# Patient Record
Sex: MALE | Race: WHITE | NOT HISPANIC OR LATINO | Employment: FULL TIME | URBAN - METROPOLITAN AREA
[De-identification: names, ages, dates, MRNs, and addresses within clinical notes are randomized per-mention and may not be internally consistent; named-entity substitution may affect disease eponyms.]

---

## 2018-12-20 ENCOUNTER — VIRTUAL VISIT (OUTPATIENT)
Dept: FAMILY MEDICINE | Facility: OTHER | Age: 38
End: 2018-12-20

## 2018-12-20 NOTE — PROGRESS NOTES
"Date:   Clinician: Lesley See  Clinician NPI: [id number]  Patient: Facundo Wakefield  Patient : 1980  Patient Address: [address] [place] [address]  Patient Phone: [phone]  Visit Protocol: Eczema  Patient Summary:  Facundo is a 38 year old ( : 1980 ) male who initiated a Visit for evaluation of atopic dermatitis (eczema). When asked the question \"Please sign me up to receive news, health information and promotions. \", Facundo responded \"No\".    Images of his skin condition were uploaded.  His symptoms started more than a month ago. The rash is located on his legs, elbows, hairline, face, chest, arms, abdomen, buttocks, scalp, and back. The rash is red in color.   The affected area has dry skin, flaky skin, scaly skin, and crusts. It feels itchy. The symptoms do not interfere with his sleep.   Symptom details   Redness: The redness has not rapidly increased in size.   Denied symptoms include blisters, sores, drainage, scabs, warm to touch, tender to touch, burning, pain, and numbness. Facundo does not feel feverish.   Treatments or home remedies used to relieve the symptoms as reported by the patient (free text): I have psoriasis and have used both Taclonex and Fluocinonide topical solutions   Precipitating events  Facundo did not come in contact with any irritants prior to the onset of his symptoms and has not been in close contact with anyone that has similar symptoms. He also did not spend time in a wooded area, swim, travel, or spend time in the sun just before his symptoms started.   Pertinent medical history  Facundo has experienced this skin condition before. His current skin condition does not come and go. The last time he experienced this skin condition was more than a year ago.   Facundo has a history of eczema.   Ongoing medical conditions were denied.   Facundo does not smoke or use smokeless tobacco.   Additional information as reported by the patient " (free text): I am looking to get a re-fill of the current medications as they are  and also look for another possible topical cream that will cover a larger area as these are for small areas on my face/hairline   MEDICATIONS: Taclonex topical, Fluocinonide-E topical, ALLERGIES: amoxicillin  Clinician Response:  Dear Facundo,  Your health is our priority. To determine the most appropriate care for you, I would like you to be seen in person to further discuss your health history and symptoms.  You will not be charged for this Visit. Thank you for trusting us with your care.   Diagnosis: Refer for additional evaluation  Diagnosis ICD: R69  Diagnosis ICD: 462.0

## 2020-03-01 ENCOUNTER — HEALTH MAINTENANCE LETTER (OUTPATIENT)
Age: 40
End: 2020-03-01

## 2020-11-21 ENCOUNTER — HOSPITAL ENCOUNTER (EMERGENCY)
Facility: CLINIC | Age: 40
Discharge: HOME | End: 2020-11-21
Attending: EMERGENCY MEDICINE | Admitting: EMERGENCY MEDICINE
Payer: COMMERCIAL

## 2020-11-21 VITALS
TEMPERATURE: 97.6 F | HEART RATE: 104 BPM | DIASTOLIC BLOOD PRESSURE: 114 MMHG | OXYGEN SATURATION: 95 % | RESPIRATION RATE: 24 BRPM | SYSTOLIC BLOOD PRESSURE: 168 MMHG

## 2020-11-21 DIAGNOSIS — R20.0 NUMBNESS: ICD-10-CM

## 2020-11-21 LAB
ANION GAP SERPL CALCULATED.3IONS-SCNC: 7 MMOL/L (ref 3–14)
BASOPHILS # BLD AUTO: 0.1 10E9/L (ref 0–0.2)
BASOPHILS NFR BLD AUTO: 0.7 %
BUN SERPL-MCNC: 5 MG/DL (ref 7–30)
CALCIUM SERPL-MCNC: 9 MG/DL (ref 8.5–10.1)
CHLORIDE SERPL-SCNC: 106 MMOL/L (ref 94–109)
CO2 SERPL-SCNC: 28 MMOL/L (ref 20–32)
CREAT SERPL-MCNC: 0.92 MG/DL (ref 0.66–1.25)
DIFFERENTIAL METHOD BLD: ABNORMAL
EOSINOPHIL # BLD AUTO: 0.1 10E9/L (ref 0–0.7)
EOSINOPHIL NFR BLD AUTO: 0.6 %
ERYTHROCYTE [DISTWIDTH] IN BLOOD BY AUTOMATED COUNT: 12.3 % (ref 10–15)
GFR SERPL CREATININE-BSD FRML MDRD: >90 ML/MIN/{1.73_M2}
GLUCOSE SERPL-MCNC: 116 MG/DL (ref 70–99)
HCT VFR BLD AUTO: 50.9 % (ref 40–53)
HGB BLD-MCNC: 17.4 G/DL (ref 13.3–17.7)
IMM GRANULOCYTES # BLD: 0.1 10E9/L (ref 0–0.4)
IMM GRANULOCYTES NFR BLD: 0.6 %
LYMPHOCYTES # BLD AUTO: 1 10E9/L (ref 0.8–5.3)
LYMPHOCYTES NFR BLD AUTO: 9.2 %
MCH RBC QN AUTO: 33.7 PG (ref 26.5–33)
MCHC RBC AUTO-ENTMCNC: 34.2 G/DL (ref 31.5–36.5)
MCV RBC AUTO: 99 FL (ref 78–100)
MONOCYTES # BLD AUTO: 0.7 10E9/L (ref 0–1.3)
MONOCYTES NFR BLD AUTO: 6.1 %
NEUTROPHILS # BLD AUTO: 9 10E9/L (ref 1.6–8.3)
NEUTROPHILS NFR BLD AUTO: 82.8 %
NRBC # BLD AUTO: 0 10*3/UL
NRBC BLD AUTO-RTO: 0 /100
PLATELET # BLD AUTO: 245 10E9/L (ref 150–450)
POTASSIUM SERPL-SCNC: 4 MMOL/L (ref 3.4–5.3)
RBC # BLD AUTO: 5.16 10E12/L (ref 4.4–5.9)
SODIUM SERPL-SCNC: 141 MMOL/L (ref 133–144)
TROPONIN I SERPL-MCNC: <0.015 UG/L (ref 0–0.04)
WBC # BLD AUTO: 10.9 10E9/L (ref 4–11)

## 2020-11-21 PROCEDURE — 250N000011 HC RX IP 250 OP 636: Performed by: EMERGENCY MEDICINE

## 2020-11-21 PROCEDURE — 84484 ASSAY OF TROPONIN QUANT: CPT | Performed by: EMERGENCY MEDICINE

## 2020-11-21 PROCEDURE — 96374 THER/PROPH/DIAG INJ IV PUSH: CPT

## 2020-11-21 PROCEDURE — 93005 ELECTROCARDIOGRAM TRACING: CPT

## 2020-11-21 PROCEDURE — 258N000003 HC RX IP 258 OP 636: Performed by: EMERGENCY MEDICINE

## 2020-11-21 PROCEDURE — 96361 HYDRATE IV INFUSION ADD-ON: CPT

## 2020-11-21 PROCEDURE — 85025 COMPLETE CBC W/AUTO DIFF WBC: CPT | Performed by: EMERGENCY MEDICINE

## 2020-11-21 PROCEDURE — 80048 BASIC METABOLIC PNL TOTAL CA: CPT | Performed by: EMERGENCY MEDICINE

## 2020-11-21 PROCEDURE — 90791 PSYCH DIAGNOSTIC EVALUATION: CPT

## 2020-11-21 PROCEDURE — 99285 EMERGENCY DEPT VISIT HI MDM: CPT | Mod: 25

## 2020-11-21 RX ORDER — LORAZEPAM 2 MG/ML
0.5 INJECTION INTRAMUSCULAR ONCE
Status: COMPLETED | OUTPATIENT
Start: 2020-11-21 | End: 2020-11-21

## 2020-11-21 RX ORDER — SODIUM CHLORIDE 9 MG/ML
INJECTION, SOLUTION INTRAVENOUS CONTINUOUS
Status: DISCONTINUED | OUTPATIENT
Start: 2020-11-21 | End: 2020-11-21 | Stop reason: HOSPADM

## 2020-11-21 RX ORDER — LORAZEPAM 0.5 MG/1
0.5 TABLET ORAL EVERY 6 HOURS PRN
Qty: 10 TABLET | Refills: 0 | Status: SHIPPED | OUTPATIENT
Start: 2020-11-21

## 2020-11-21 RX ADMIN — SODIUM CHLORIDE 1000 ML: 9 INJECTION, SOLUTION INTRAVENOUS at 18:07

## 2020-11-21 RX ADMIN — LORAZEPAM 0.5 MG: 2 INJECTION INTRAMUSCULAR; INTRAVENOUS at 18:07

## 2020-11-21 ASSESSMENT — ENCOUNTER SYMPTOMS
NUMBNESS: 1
NERVOUS/ANXIOUS: 1

## 2020-11-21 NOTE — ED AVS SNAPSHOT
[facility] Emergency Dept  [address]  [facility] [address]  Phone: [phone]  Fax: [phone]                                    Facundo Wakefield   MRN: [id number]    Department: [facility] Emergency Dept   Date of Visit: 11/21/2020           After Visit Summary Signature Page    I have received my discharge instructions, and my questions have been answered. I have discussed any challenges I see with this plan with the nurse or doctor.    ..........................................................................................................................................  Patient/Patient Representative Signature      ..........................................................................................................................................  Patient Representative Print Name and Relationship to Patient    ..................................................               ................................................  Date                                   Time    ..........................................................................................................................................  Reviewed by Signature/Title    ...................................................              ..............................................  Date                                               Time          22EPIC Rev 08/18

## 2020-11-21 NOTE — ED TRIAGE NOTES
Patient presents to the ED reporting generalized numbness and anxiety beginning 1 hour ago. Reports has also had some cough and SOB intermittently.

## 2020-11-21 NOTE — ED PROVIDER NOTES
History     Chief Complaint:  Numbness and Anxiety    HPI   Facundo Wakefield is a 40 year old male with history of chronic back pain and anxiety who presents for evaluation of diffuse numbness and increased anxiety. The patient states about 1 hour prior to arrival, he developed diffuse numbness and increased anxiety. He also endorses weakness in his legs. Out of concern for his symptoms, he presented for evaluation.    Here, the patient states he had similar symptoms in the past that were attributed to anxiety and there was no trigger that caused his symptoms at that time. Today he does not know of a trigger that caused his anxiety, but notes he has had increased stress due to chronic thoracic back pain with chest pain. He denies any other symptoms prompting his presentation.     Allergies:  Amoxicillin    Medications:   Neurontin    Past Medical History:    Chronic back pain  Psoriasis  Anxiety    Past Surgical History:    History reviewed. No pertinent past surgical history.     Family History:    Father - Cardiovascular disease  Mother - Cerebrovascular disease     Social History:  The patient was unaccompanied to the ED.  Smoking Status: Never Smoker  Smokeless Tobacco: Former User  Alcohol Use: Yes  Drug Use: No  PCP: Fady Black   Marital Status:     Review of Systems   Neurological: Positive for numbness.   Psychiatric/Behavioral: The patient is nervous/anxious.    All other systems reviewed and are negative.    Physical Exam     Patient Vitals for the past 24 hrs:   BP Temp Temp src Pulse Resp SpO2   11/21/20 2100 (!) 168/114 -- -- 104 -- --   11/21/20 2015 (!) 161/122 -- -- -- -- --   11/21/20 2000 (!) 163/122 -- -- 121 -- 95 %   11/21/20 1945 (!) 176/124 -- -- -- -- 94 %   11/21/20 1810 (!) 168/118 -- -- 110 -- --   11/21/20 1710 (!) 181/123 97.6  F (36.4  C) Oral 121 24 98 %     Physical Exam    General: Patient is alert and interactive when I enter the room  Head:  The scalp, face, and  head appear normal  Eyes:  Conjunctivae are normal  ENT:    The nose is normal    Pinnae are normal    External acoustic canals are normal  Neck:  Trachea midline  CV:  Pulses are normal, tachycardic.    Resp:  No respiratory distress   Abdomen:      Soft, non-tender, non-distended  Musc:  Normal muscular tone    No major joint effusions    No asymmetric leg swelling  Skin:  No rash or lesions noted  Neuro:  Speech is normal and fluent. Face is symmetric.     Moving all extremities well. No pronator drift.   Psych:  Awake. Alert.  Normal affect. Anxious.  Appropriate interactions.   Emergency Department Course     ECG:  ECG taken at 1744, ECG read at 1745 by Allie Kirby MD  Sinus tachycardia  Otherwise normal ECG  Rate 105 bpm. AR interval 160. QRS duration 80. QT/QTc 328/433. P-R-T axes 30 -2 31.      Laboratory:  Laboratory findings were communicated with the patient who voiced understanding of the findings.    CBC: AWNL (WBC 10.9, HGB 17.4, )  BMP: Glucose 116 (H), Bun 5 (L) o/w WNL (Creatinine 0.92)   Troponin (Collected 1806): <0.015      Interventions:  1807 0.9% NaCl bolus 1000 mL IV   1807 Ativan 0.5 mg IV    Emergency Department Course:  Past medical records, nursing notes, and vitals reviewed.  EKG obtained in the ED, see results above.    IV was inserted and blood was drawn for laboratory testing, results above.     (1717)   I performed an exam of the patient as documented above. History obtained from patient.     (2021)   I spoke with Jenniffer of the ED service regarding the patient's presentation, findings, and plan of care.     (2106)   I rechecked the patient and discussed results and plan of care.     (2126)   I rechecked the patient prior to discharge and answered his questions.     Findings and plan explained to the Patient. Patient discharged home with instructions regarding supportive care, medications, and reasons to return. The importance of close follow-up was reviewed. The patient  was prescribed Ativan. I personally reviewed the laboratory results with the Patient and answered all related questions prior to discharge.     Impression & Plan     Covid-19  Facundo Wakefield was evaluated during a global COVID-19 pandemic, which necessitated consideration that the patient might be at risk for infection with the SARS-CoV-2 virus that causes COVID-19.   Applicable protocols for evaluation were followed during the patient's care.   COVID-19 was considered as part of the patient's evaluation.    Medical Decision Making:  Facundo Wakefield is a 40 year old male who presents with whole body numbness and anxiety.  Patient symptoms do seem to consistent with anxiety or panic attack.  He describes full body numbness tachycardia and feeling anxious.  He reports this happened before and it was anxiety.  Patient was given Ativan with improvement of his symptoms.  Blood work and EKG were unremarkable.  We did have [facility] evaluate the patient and set him up for outpatient resources.  I doubt ACS or PE as he does not really have any chest pain or shortness of breath beyond his chronic back pain that wraps to the front.  I will give him a course of Ativan to take as needed for home. Patient discharged.  Diagnosis:    ICD-10-CM    1. Numbness  R20.0      Disposition:  Discharged to home.    Discharge Medications:  New Prescriptions    LORAZEPAM (ATIVAN) 0.5 MG TABLET    Take 1 tablet (0.5 mg) by mouth every 6 hours as needed for anxiety       Scribe Disclosure:  KAMLESH Ronaldleonard Landon, am serving as a scribe at 5:15 PM on 11/21/2020 to document services personally performed by Allie Kirby MD based on my observations and the provider's statements to me.  November 21, 2020   [facility] EMERGENCY DEPT        Allie Kirby MD  11/22/20 [id number]

## 2020-11-23 LAB — INTERPRETATION ECG - MUSE: NORMAL

## 2020-12-14 ENCOUNTER — HEALTH MAINTENANCE LETTER (OUTPATIENT)
Age: 40
End: 2020-12-14

## 2021-04-17 ENCOUNTER — HEALTH MAINTENANCE LETTER (OUTPATIENT)
Age: 41
End: 2021-04-17

## 2021-09-24 ENCOUNTER — TELEPHONE (OUTPATIENT)
Dept: FAMILY MEDICINE | Facility: CLINIC | Age: 41
End: 2021-09-24

## 2021-09-24 ENCOUNTER — E-VISIT (OUTPATIENT)
Dept: FAMILY MEDICINE | Facility: CLINIC | Age: 41
End: 2021-09-24
Payer: COMMERCIAL

## 2021-09-24 DIAGNOSIS — R10.30 GROIN PAIN, UNSPECIFIED LATERALITY: Primary | ICD-10-CM

## 2021-09-24 PROCEDURE — 99207 PR NON-BILLABLE SERV PER CHARTING: CPT | Performed by: PHYSICIAN ASSISTANT

## 2021-09-24 NOTE — TELEPHONE ENCOUNTER
Please call patient - he submitted evisit for hernia but this needs in clinic evaluation. I saw him only once over 5 or 6 years ago so really can schedule with anyone who has availability.

## 2021-10-02 ENCOUNTER — HEALTH MAINTENANCE LETTER (OUTPATIENT)
Age: 41
End: 2021-10-02

## 2021-10-05 ENCOUNTER — OFFICE VISIT (OUTPATIENT)
Dept: SURGERY | Facility: CLINIC | Age: 41
End: 2021-10-05
Payer: COMMERCIAL

## 2021-10-05 VITALS
WEIGHT: 199 LBS | SYSTOLIC BLOOD PRESSURE: 136 MMHG | RESPIRATION RATE: 16 BRPM | BODY MASS INDEX: 27.86 KG/M2 | OXYGEN SATURATION: 98 % | HEIGHT: 71 IN | DIASTOLIC BLOOD PRESSURE: 86 MMHG | HEART RATE: 80 BPM

## 2021-10-05 DIAGNOSIS — K42.9 UMBILICAL HERNIA WITHOUT OBSTRUCTION AND WITHOUT GANGRENE: Primary | ICD-10-CM

## 2021-10-05 PROCEDURE — 99203 OFFICE O/P NEW LOW 30 MIN: CPT | Performed by: SURGERY

## 2021-10-05 ASSESSMENT — MIFFLIN-ST. JEOR: SCORE: 1824.79

## 2021-10-05 NOTE — PROGRESS NOTES
[facility]  New Patient Office Visit      Assessment:    Facundo Wakefield is a 41 year old male with a Primary, reducible umbilical hernia.  Possible small left inguinal hernia    Plan:    Open umbilical hernia repair, possible mesh  We discussed a left inguinal hernia would not cause symptoms of the left medial thigh muscular pain he is describing so recommend watching that site for now.  We will schedule surgery if the patient elects.  Currently would like to monitor the hernia, as he is having other back issues he was going to address.      We have discussed observation, reduction techniques and importance, incarceration and strangulation signs, symptoms and importance as well as need to seek emergency treatment.      We have discussed hernia repair with mesh in detail, including benefits, alternatives, complications, incision, scar, mesh, infection, anesthesia, bleeding, DVT,  hernia recurrence, lifting and activity limits after surgery.  All questions have been answered to the best of my ability.    He has been given literature to review.     Recommended time off work postop:1 wks  Recommended time off lifting 20 lb:   5 wks        Facundo Wakefield is seen in consultation for a hernia, at the request of Fady Black PA-C.                    HPI:  Facundo Wakefield is a 41 year old male who presents for evaluation of a lump in the kathleen-umbilical region.  He first noticed a lump 3 months ago. He describes an inciting event:  Yes -had a bad chest cold and was coughing a lot.    He does have pain and states it feels sharp. He describes exacerbating factors including coughing.    He also describes medial left thigh pain which seems to happen after playing hockey where he turned suddenly and twisted his leg.  There is some pain up in the groin as well, he is never seen or felt a bulge there.    Nausea/vomitting/bloating:  Bloating    Previous surgery in this location:  No     Previous herniorrhaphy:  " No     Constipation: Yes  Cough: He had a cough a couple months ago but that has resolved  Diabetes: No  Current Smoker: No  Heavy lifting > 20 lb: Occasionally    Past Medical History:  No past medical history on file.    Past Surgical History:  No past surgical history on file.     Social History:  Social History     Tobacco Use     Smoking status: Never Smoker     Smokeless tobacco: Former User   Substance Use Topics     Alcohol use: Yes     Comment: moderate     Drug use: No      Family History:  Family History   Problem Relation Age of Onset     Cardiovascular Father      Cerebrovascular Disease Mother        ROS:  The 10 point review of systems is negative other than noted in the HPI and/or below.    PE:    Vitals: /86   Pulse 80   Resp 16   Ht 1.803 m (5' 11\")   Wt 90.3 kg (199 lb)   SpO2 98%   BMI 27.75 kg/m    BMI= Body mass index is 27.75 kg/m .    General: Generally appears well.  Psych: Alert and Oriented.  Normal affect  Neurological: non-focal, moves extremities symmetrically, grossly normal strength and sensation  Eyes: Sclera clear  GI: Abdomen rounded hernia:  umbilical, 1 cm, reducible, tender   Left groin-possible small bulge with Valsalva at the external ring, right side no obvious hernia palpated  MSK: extremities without edema  Integumentary: Various psoriasis patches    Data none    30 minutes spent on the date of the encounter doing chart review, history and exam, documentation and further activities as noted above      Sherri Kimbrough MD  10/05/21 11:46 AM       "

## 2021-10-05 NOTE — LETTER
2021    RE: Facundo Wakefield, : 1980      [facility]  New Patient Office Visit        Assessment:    Facundo Wakefield is a 41 year old male with a Primary, reducible umbilical hernia.  Possible small left inguinal hernia     Plan:    Open umbilical hernia repair, possible mesh  We discussed a left inguinal hernia would not cause symptoms of the left medial thigh muscular pain he is describing so recommend watching that site for now.  We will schedule surgery if the patient elects.  Currently would like to monitor the hernia, as he is having other back issues he was going to address.      We have discussed observation, reduction techniques and importance, incarceration and strangulation signs, symptoms and importance as well as need to seek emergency treatment.       We have discussed hernia repair with mesh in detail, including benefits, alternatives, complications, incision, scar, mesh, infection, anesthesia, bleeding, DVT,  hernia recurrence, lifting and activity limits after surgery.  All questions have been answered to the best of my ability.     He has been given literature to review.      Recommended time off work postop:1 wks  Recommended time off lifting 20 lb:   5 wks      Facundo Wakefield is seen in consultation for a hernia, at the request of Fady Black PA-C.      HPI:  Facundo Wakefield is a 41 year old male who presents for evaluation of a lump in the kathleen-umbilical region.  He first noticed a lump 3 months ago. He describes an inciting event:  Yes -had a bad chest cold and was coughing a lot.     He does have pain and states it feels sharp. He describes exacerbating factors including coughing.     He also describes medial left thigh pain which seems to happen after playing hockey where he turned suddenly and twisted his leg.  There is some pain up in the groin as well, he is never seen or felt a bulge there.     Nausea/vomitting/bloating:  Bloating    Previous  "surgery in this location:  No     Previous herniorrhaphy:  No      Constipation: Yes  Cough: He had a cough a couple months ago but that has resolved  Diabetes: No  Current Smoker: No  Heavy lifting > 20 lb: Occasionally     ROS:  The 10 point review of systems is negative other than noted in the HPI and/or below.     PE:    Vitals: /86   Pulse 80   Resp 16   Ht 1.803 m (5' 11\")   Wt 90.3 kg (199 lb)   SpO2 98%   BMI 27.75 kg/m    BMI= Body mass index is 27.75 kg/m .     General: Generally appears well.  Psych: Alert and Oriented.  Normal affect  Neurological: non-focal, moves extremities symmetrically, grossly normal strength and sensation  Eyes: Sclera clear  GI: Abdomen rounded hernia:  umbilical, 1 cm, reducible, tender   Left groin-possible small bulge with Valsalva at the external ring, right side no obvious hernia palpated  MSK: extremities without edema  Integumentary: Various psoriasis patches     Data none           Sherri Kimbrough MD    "

## 2022-05-14 ENCOUNTER — HEALTH MAINTENANCE LETTER (OUTPATIENT)
Age: 42
End: 2022-05-14

## 2022-09-03 ENCOUNTER — HEALTH MAINTENANCE LETTER (OUTPATIENT)
Age: 42
End: 2022-09-03

## 2023-06-02 ENCOUNTER — HEALTH MAINTENANCE LETTER (OUTPATIENT)
Age: 43
End: 2023-06-02

## 2024-02-01 ENCOUNTER — E-VISIT (OUTPATIENT)
Dept: FAMILY MEDICINE | Facility: CLINIC | Age: 44
End: 2024-02-01
Payer: COMMERCIAL

## 2024-02-01 DIAGNOSIS — J01.90 ACUTE RHINOSINUSITIS: Primary | ICD-10-CM

## 2024-02-01 PROCEDURE — 99421 OL DIG E/M SVC 5-10 MIN: CPT | Performed by: PHYSICIAN ASSISTANT

## 2024-02-01 RX ORDER — DOXYCYCLINE 100 MG/1
100 TABLET ORAL 2 TIMES DAILY
Qty: 20 TABLET | Refills: 0 | Status: SHIPPED | OUTPATIENT
Start: 2024-02-01 | End: 2024-02-11

## 2024-07-06 ENCOUNTER — HEALTH MAINTENANCE LETTER (OUTPATIENT)
Age: 44
End: 2024-07-06

## 2024-08-23 ENCOUNTER — TELEPHONE (OUTPATIENT)
Dept: FAMILY MEDICINE | Facility: CLINIC | Age: 44
End: 2024-08-23

## 2024-08-23 NOTE — TELEPHONE ENCOUNTER
Patient Quality Outreach    Patient is due for the following:   Depression  -  PHQ-9 needed and Depression follow-up visit    Next Steps:   Schedule a office visit for Depression     Type of outreach:    Sent [employer] message.    Next Steps:  Reach out within 90 days via [employer].    Max number of attempts reached: No. Will try again in 90 days if patient still on fail list.    Questions for provider review:    None           Kirsten Arevalo MA  Chart routed to Care Team.

## 2025-02-18 ENCOUNTER — TELEPHONE (OUTPATIENT)
Dept: FAMILY MEDICINE | Facility: CLINIC | Age: 45
End: 2025-02-18
Payer: COMMERCIAL

## 2025-02-18 NOTE — TELEPHONE ENCOUNTER
Patient Quality Outreach    Patient is due for the following:   Colon Cancer Screening    Action(s) Taken:   No follow up needed at this time.    Type of outreach:    Sent [employer] message.    Questions for provider review:    None           Kalyn Partida MA

## 2025-03-22 ENCOUNTER — E-VISIT (OUTPATIENT)
Dept: URGENT CARE | Facility: CLINIC | Age: 45
End: 2025-03-22
Payer: COMMERCIAL

## 2025-03-22 DIAGNOSIS — J01.90 ACUTE SINUSITIS WITH SYMPTOMS > 10 DAYS: Primary | ICD-10-CM

## 2025-03-23 RX ORDER — DOXYCYCLINE HYCLATE 100 MG
100 TABLET ORAL 2 TIMES DAILY
Qty: 14 TABLET | Refills: 0 | Status: SHIPPED | OUTPATIENT
Start: 2025-03-23 | End: 2025-03-30

## 2025-03-23 NOTE — PATIENT INSTRUCTIONS
Thank you for choosing us for your care. I have placed an order for a prescription so that you can start treatment:  Orders Placed This Encounter   Medications     doxycycline hyclate (VIBRA-TABS) 100 MG tablet     Sig: Take 1 tablet (100 mg) by mouth 2 times daily for 7 days.     Dispense:  14 tablet     Refill:  0     May substitute any formulation of 100mg doxycycline available and best covered by insurance          View your full visit summary for details by clicking on the link below. Your pharmacist will able to address any questions you may have about the medication.     If you're not feeling better within 5-7 days, please schedule an appointment.  You can schedule an appointment right here in [facility], or call [phone]  If the visit is for the same symptoms as your eVisit, we'll refund the cost of your eVisit if seen within seven days.

## 2025-04-01 ENCOUNTER — ANCILLARY ORDERS (OUTPATIENT)
Dept: GENERAL RADIOLOGY | Facility: CLINIC | Age: 45
End: 2025-04-01
Payer: COMMERCIAL

## 2025-04-01 DIAGNOSIS — M54.50 LUMBAR PAIN: ICD-10-CM

## 2025-04-01 DIAGNOSIS — M54.6 THORACIC BACK PAIN, UNSPECIFIED BACK PAIN LATERALITY, UNSPECIFIED CHRONICITY: Primary | ICD-10-CM

## 2025-07-13 ENCOUNTER — HEALTH MAINTENANCE LETTER (OUTPATIENT)
Age: 45
End: 2025-07-13